# Patient Record
Sex: FEMALE | Race: OTHER | HISPANIC OR LATINO | Employment: FULL TIME | URBAN - METROPOLITAN AREA
[De-identification: names, ages, dates, MRNs, and addresses within clinical notes are randomized per-mention and may not be internally consistent; named-entity substitution may affect disease eponyms.]

---

## 2023-03-08 ENCOUNTER — HOSPITAL ENCOUNTER (OUTPATIENT)
Facility: MEDICAL CENTER | Age: 58
End: 2023-03-08
Attending: OBSTETRICS & GYNECOLOGY
Payer: COMMERCIAL

## 2023-03-08 ENCOUNTER — OFFICE VISIT (OUTPATIENT)
Dept: OBGYN | Facility: CLINIC | Age: 58
End: 2023-03-08
Payer: COMMERCIAL

## 2023-03-08 VITALS — WEIGHT: 255 LBS | DIASTOLIC BLOOD PRESSURE: 75 MMHG | SYSTOLIC BLOOD PRESSURE: 131 MMHG

## 2023-03-08 DIAGNOSIS — Z01.419 WELL WOMAN EXAM WITH ROUTINE GYNECOLOGICAL EXAM: ICD-10-CM

## 2023-03-08 DIAGNOSIS — Z30.432 ENCOUNTER FOR IUD REMOVAL: ICD-10-CM

## 2023-03-08 PROCEDURE — 99386 PREV VISIT NEW AGE 40-64: CPT | Mod: 25 | Performed by: OBSTETRICS & GYNECOLOGY

## 2023-03-08 PROCEDURE — 87624 HPV HI-RISK TYP POOLED RSLT: CPT

## 2023-03-08 PROCEDURE — 58301 REMOVE INTRAUTERINE DEVICE: CPT | Performed by: OBSTETRICS & GYNECOLOGY

## 2023-03-08 PROCEDURE — 88175 CYTOPATH C/V AUTO FLUID REDO: CPT

## 2023-03-08 RX ORDER — GLIMEPIRIDE 4 MG/1
8 TABLET ORAL
COMMUNITY
Start: 2023-01-27

## 2023-03-08 RX ORDER — SEMAGLUTIDE 2.68 MG/ML
INJECTION, SOLUTION SUBCUTANEOUS
COMMUNITY
Start: 2023-01-10

## 2023-03-08 RX ORDER — ASPIRIN 81 MG/1
TABLET ORAL
COMMUNITY

## 2023-03-08 RX ORDER — ATORVASTATIN CALCIUM 10 MG/1
10 TABLET, FILM COATED ORAL
COMMUNITY
Start: 2023-01-27

## 2023-03-08 RX ORDER — HYDROCHLOROTHIAZIDE 25 MG/1
25 TABLET ORAL
COMMUNITY
Start: 2023-01-27

## 2023-03-08 RX ORDER — LISINOPRIL 5 MG/1
5 TABLET ORAL
COMMUNITY
Start: 2023-01-27

## 2023-03-08 RX ORDER — LATANOPROST 50 UG/ML
SOLUTION/ DROPS OPHTHALMIC
COMMUNITY
Start: 2023-02-14

## 2023-03-08 RX ORDER — METFORMIN HYDROCHLORIDE 500 MG/1
1000 TABLET, EXTENDED RELEASE ORAL 2 TIMES DAILY
COMMUNITY
Start: 2023-02-04

## 2023-03-08 NOTE — PROGRESS NOTES
Gynecology Annual    Roberta Po Cisneros    57 y.o.    Chief complaint    Chief Complaint   Patient presents with    Gynecologic Exam     New Patient        HPI    Patient is a 58 yo  who presents for gyn annual exam and pap.   She has had a Mirena IUD in place, her current was replaced back in . She does desire it removed today as she thinks she is likely postmenopausal. Is appropriately amenorrheic.   She has lost about 40 lbs intentionally over the last few months through intermittent fasting and improving her diet.   Sexually active with , has good marriage, feels safe. No issues with intercourse, denies dryness or discomfort.   Denies pelvic pain, abnormal discharge, vaginal bleeding or changes in bowel/bladder. Does have more frequent bowel movements due to increase in fiber.     Consented for IUD removal, risks of bleeding, damage to cervix, need for further operation discussed.       Review of Systems:  Review of Systems   All other systems reviewed and are negative.     Past Obstetrical History:     x 1  C/S x 1    Past Gynecological History:    Last pap:   H/o abnormal pap: No  H/o STIs: No  Colon cancer screening - s/p cologard screening < 6 months ago  DEXA: N/A  Last Mammogram: < 6 months ago. Being followed for lipoma of left breast  LMP: No LMP recorded. Patient has had an implant.  BCM: Postmenopause    Past Medical History    Past Medical History:   Diagnosis Date    Diabetes (HCC)     Hypertension     Morbid obesity (HCC)        Past Surgical History    Past Surgical History:   Procedure Laterality Date    LIPOMA EXCISION Left        Family History   Problem Relation Age of Onset    No Known Problems Sister     Diabetes Brother        Allergies    No Known Allergies    Medications    Current Outpatient Medications   Medication Sig Dispense Refill    aspirin 81 MG EC tablet Take  by mouth.      atorvastatin (LIPITOR) 10 MG Tab Take 10 mg by mouth every day.       glimepiride (AMARYL) 4 MG Tab Take 8 mg by mouth every day.      hydroCHLOROthiazide (HYDRODIURIL) 25 MG Tab Take 25 mg by mouth every day.      insulin NPH (HUMULIN/NOVOLIN) 100 UNIT/ML Suspension Inject 24 units subcutaneously every morning and 40 units every evening or as directed to control blood glucose.      latanoprost (XALATAN) 0.005 % Solution 1 DROP IN BOTH EYES AT BEDTIME      lisinopril (PRINIVIL) 5 MG Tab Take 5 mg by mouth every day.      metFORMIN ER (GLUCOPHAGE XR) 500 MG TABLET SR 24 HR Take 1,000 mg by mouth 2 times a day.      OZEMPIC, 2 MG/DOSE, 8 MG/3ML Solution Pen-injector INJECT 2 MG SUBCUTANEOUSLY ONCE A WEEK       No current facility-administered medications for this visit.       Social    Social History     Tobacco Use    Smoking status: Never    Smokeless tobacco: Never   Vaping Use    Vaping Use: Never used   Substance Use Topics    Alcohol use: Yes     Comment: x4 times a day during weekend    Drug use: Never        OBJECTIVE:    Vitals    /75   Wt 255 lb     Physical Exam    GENERAL: Well developed, well nourished, female in no acute distress.    HEENT: NCAT, mucus membranes moist    Neck: Supple, nontender, no MARLA, no thyromegaly    Breasts: Symmetric, Nontender, no masses, no nipple discharge, no skin changes; small 1-2 cm smooth nodule at RUQ of left breast. Just deep to prior breast lipoma excision scar.     CV: RRR    Pulm: CTAB    Abdomen: Soft ND NT.    Ext: POE    : Normal Vulva, vagina. No lesions present. No abnormal discharge. No blood.    Urethral meatus normal.    Cervix smooth pink no lesions, abnormal discharge or blood. IUD strings visible at os. Pap collected.     Uterus small midline AV mobile nontender, difficult to assess due to habitus.     Adnexa no masses or tenderness    Brief Procedure Note    Patient consented for IUD removal. Patient placed in lithotomy.   Speculum placed, cervix visualized. IUD strings visible.   IUD strings grasped with ring  forceps and removed with gentle traction intact. Scant oozing of blood at os, no active bleeding noted.   Speculum removed. Patient tolerated procedure well.     Labs/Pathology:    None    A/P    There is no problem list on file for this patient.      Roberta Hillndo Amelia    57 y.o.        1. Well woman exam with routine gynecological exam - f/u pap. Continue yearly pelvic and breast exam. Up to date with breast cancer and colon cancer screening per patient. F/u with PCP for routine health maintenance/exams.    2. Encounter for IUD removal - IUD removed today with no complications. Advised to follow up if she has heavy and/or irregular bleeding.      RTC in 1 year or PRN.         Time spent: 30 minutes      Riri Grace M.D.    Obstetrics and Gynecology    3/8/06989:31 PM

## 2023-03-09 DIAGNOSIS — Z01.419 WELL WOMAN EXAM WITH ROUTINE GYNECOLOGICAL EXAM: ICD-10-CM

## 2023-03-10 LAB
CYTOLOGY REG CYTOL: NORMAL
HPV HR 12 DNA CVX QL NAA+PROBE: NEGATIVE
HPV16 DNA SPEC QL NAA+PROBE: NEGATIVE
HPV18 DNA SPEC QL NAA+PROBE: NEGATIVE
SPECIMEN SOURCE: NORMAL

## 2025-05-28 ENCOUNTER — OFFICE VISIT (OUTPATIENT)
Dept: URGENT CARE | Facility: PHYSICIAN GROUP | Age: 60
End: 2025-05-28
Payer: COMMERCIAL

## 2025-05-28 VITALS
RESPIRATION RATE: 18 BRPM | WEIGHT: 227.9 LBS | OXYGEN SATURATION: 95 % | DIASTOLIC BLOOD PRESSURE: 82 MMHG | SYSTOLIC BLOOD PRESSURE: 124 MMHG | HEART RATE: 84 BPM | TEMPERATURE: 96.7 F | HEIGHT: 64 IN | BODY MASS INDEX: 38.91 KG/M2

## 2025-05-28 DIAGNOSIS — H61.22 IMPACTED CERUMEN OF LEFT EAR: Primary | ICD-10-CM

## 2025-05-28 PROCEDURE — 3074F SYST BP LT 130 MM HG: CPT | Performed by: PHYSICIAN ASSISTANT

## 2025-05-28 PROCEDURE — 99202 OFFICE O/P NEW SF 15 MIN: CPT | Performed by: PHYSICIAN ASSISTANT

## 2025-05-28 PROCEDURE — 3079F DIAST BP 80-89 MM HG: CPT | Performed by: PHYSICIAN ASSISTANT

## 2025-05-28 RX ORDER — TIRZEPATIDE 5 MG/.5ML
INJECTION, SOLUTION SUBCUTANEOUS
COMMUNITY
Start: 2025-04-10

## 2025-05-28 ASSESSMENT — ENCOUNTER SYMPTOMS
DIZZINESS: 0
SORE THROAT: 0
COUGH: 0
HEADACHES: 0
FEVER: 0
CHILLS: 0

## 2025-05-29 NOTE — PROGRESS NOTES
Subjective:     CHIEF COMPLAINT  Chief Complaint   Patient presents with    Ear Pain     L ear, feels there is something on it .       HPI  Roberta Cisneros is a very pleasant 59 y.o. female who presents to the clinic with muffled hearing out of the left ear and a fullness sensation x 2 days.  Denies any associated pain.  No tinnitus.  Has otherwise been feeling well.  No URI-like symptoms.  No headache, dizziness, nausea or vomiting.    REVIEW OF SYSTEMS  Review of Systems   Constitutional:  Negative for chills, fever and malaise/fatigue.   HENT:  Negative for congestion, ear pain and sore throat.         Left ear fullness and muffled hearing   Respiratory:  Negative for cough.    Skin:  Negative for rash.   Neurological:  Negative for dizziness and headaches.       PAST MEDICAL HISTORY  There are no active problems to display for this patient.      SURGICAL HISTORY   has a past surgical history that includes lipoma excision (Left).    ALLERGIES  Allergies[1]    CURRENT MEDICATIONS  Home Medications       Reviewed by Igor London P.A.-C. (Physician Assistant) on 05/28/25 at 1708  Med List Status: <None>     Medication Last Dose Status   aspirin 81 MG EC tablet Taking Active   atorvastatin (LIPITOR) 10 MG Tab Taking Active   glimepiride (AMARYL) 4 MG Tab Taking Active   hydroCHLOROthiazide (HYDRODIURIL) 25 MG Tab Taking Active   latanoprost (XALATAN) 0.005 % Solution Taking Active   lisinopril (PRINIVIL) 5 MG Tab Taking Active   metFORMIN ER (GLUCOPHAGE XR) 500 MG TABLET SR 24 HR Taking Active   MOUNJARO 5 MG/0.5ML Solution Auto-injector Taking Active   OZEMPIC, 2 MG/DOSE, 8 MG/3ML Solution Pen-injector Not Taking Active                    SOCIAL HISTORY  Social History     Tobacco Use    Smoking status: Never    Smokeless tobacco: Never   Vaping Use    Vaping status: Never Used   Substance and Sexual Activity    Alcohol use: Yes     Comment: x4 times a day during weekend    Drug use: Never    Sexual  "activity: Yes     Partners: Male     Birth control/protection: I.U.D.       FAMILY HISTORY  Family History   Problem Relation Age of Onset    No Known Problems Sister     Diabetes Brother           Objective:     VITAL SIGNS: /82   Pulse 84   Temp 35.9 °C (96.7 °F)   Resp 18   Ht 1.626 m (5' 4\")   Wt 103 kg (227 lb 14.4 oz)   SpO2 95%   BMI 39.12 kg/m²     PHYSICAL EXAM  Physical Exam  Constitutional:       Appearance: Normal appearance.   HENT:      Head: Normocephalic and atraumatic.      Right Ear: Tympanic membrane, ear canal and external ear normal. There is no impacted cerumen.      Left Ear: External ear normal. There is impacted cerumen.   Eyes:      Conjunctiva/sclera: Conjunctivae normal.   Pulmonary:      Effort: Pulmonary effort is normal.   Musculoskeletal:      Cervical back: Normal range of motion.   Neurological:      General: No focal deficit present.      Mental Status: She is alert and oriented to person, place, and time. Mental status is at baseline.         Assessment/Plan:     1. Impacted cerumen of left ear            MDM/Comments:    Ear lavage performed in clinic by the medical assistant.  Patient tolerated this well without complication.  To visualize the TM after the procedure.  No TM injection, bulging or perforation.  Canal patent.  Symptoms fully improved.    Differential diagnosis, natural history, supportive care, and indications for immediate follow-up discussed. All questions answered. Patient agrees with the plan of care.    Follow-up as needed if symptoms worsen or fail to improve to PCP, Urgent care or Emergency Room.    I have personally reviewed prior external notes and test results pertinent to today's visit.  I have independently reviewed and interpreted all diagnostics ordered during this urgent care acute visit.   Discussed management options (risks,benefits, and alternatives to treatment). Pt expresses understanding and the treatment plan was agreed upon. " Questions were encouraged and answered to pt's satisfaction.    Please note that this dictation was created using voice recognition software. I have made a reasonable attempt to correct obvious errors, but I expect that there are errors of grammar and possibly content that I did not discover before finalizing the note.       [1] No Known Allergies